# Patient Record
Sex: MALE | URBAN - METROPOLITAN AREA
[De-identification: names, ages, dates, MRNs, and addresses within clinical notes are randomized per-mention and may not be internally consistent; named-entity substitution may affect disease eponyms.]

---

## 2018-03-30 ENCOUNTER — TRANSFERRED RECORDS (OUTPATIENT)
Dept: HEALTH INFORMATION MANAGEMENT | Facility: CLINIC | Age: 28
End: 2018-03-30

## 2018-03-31 ENCOUNTER — HOSPITAL ENCOUNTER (INPATIENT)
Facility: CLINIC | Age: 28
LOS: 5 days | Discharge: HOME | End: 2018-04-05
Attending: PSYCHIATRY & NEUROLOGY | Admitting: PSYCHIATRY & NEUROLOGY
Payer: COMMERCIAL

## 2018-03-31 DIAGNOSIS — F51.01 PRIMARY INSOMNIA: ICD-10-CM

## 2018-03-31 DIAGNOSIS — J30.1 CHRONIC SEASONAL ALLERGIC RHINITIS DUE TO POLLEN: ICD-10-CM

## 2018-03-31 DIAGNOSIS — F33.1 MAJOR DEPRESSIVE DISORDER, RECURRENT EPISODE, MODERATE (H): ICD-10-CM

## 2018-03-31 DIAGNOSIS — K21.9 GASTROESOPHAGEAL REFLUX DISEASE WITHOUT ESOPHAGITIS: ICD-10-CM

## 2018-03-31 DIAGNOSIS — F41.1 GAD (GENERALIZED ANXIETY DISORDER): Primary | ICD-10-CM

## 2018-03-31 PROBLEM — R45.851 SUICIDAL IDEATION: Status: ACTIVE | Noted: 2018-03-31

## 2018-03-31 PROCEDURE — 12400007 ZZH R&B MH INTERMEDIATE UMMC

## 2018-03-31 PROCEDURE — 25000132 ZZH RX MED GY IP 250 OP 250 PS 637: Performed by: PSYCHIATRY & NEUROLOGY

## 2018-03-31 RX ORDER — HYDROXYZINE HYDROCHLORIDE 25 MG/1
25-50 TABLET, FILM COATED ORAL EVERY 4 HOURS PRN
Status: DISCONTINUED | OUTPATIENT
Start: 2018-03-31 | End: 2018-04-02

## 2018-03-31 RX ORDER — ESZOPICLONE 3 MG/1
3 TABLET, FILM COATED ORAL AT BEDTIME
Status: DISCONTINUED | OUTPATIENT
Start: 2018-03-31 | End: 2018-04-01

## 2018-03-31 RX ORDER — TRAZODONE HYDROCHLORIDE 50 MG/1
50 TABLET, FILM COATED ORAL
Status: DISCONTINUED | OUTPATIENT
Start: 2018-03-31 | End: 2018-04-02

## 2018-03-31 RX ORDER — ACETAMINOPHEN 325 MG/1
650 TABLET ORAL EVERY 4 HOURS PRN
Status: DISCONTINUED | OUTPATIENT
Start: 2018-03-31 | End: 2018-04-05 | Stop reason: HOSPADM

## 2018-03-31 RX ORDER — BISACODYL 10 MG
10 SUPPOSITORY, RECTAL RECTAL DAILY PRN
Status: DISCONTINUED | OUTPATIENT
Start: 2018-03-31 | End: 2018-04-05 | Stop reason: HOSPADM

## 2018-03-31 RX ORDER — ALUMINA, MAGNESIA, AND SIMETHICONE 2400; 2400; 240 MG/30ML; MG/30ML; MG/30ML
30 SUSPENSION ORAL EVERY 4 HOURS PRN
Status: DISCONTINUED | OUTPATIENT
Start: 2018-03-31 | End: 2018-04-05 | Stop reason: HOSPADM

## 2018-03-31 RX ORDER — OLANZAPINE 10 MG/1
10 TABLET ORAL
Status: DISCONTINUED | OUTPATIENT
Start: 2018-03-31 | End: 2018-04-02

## 2018-03-31 RX ORDER — OLANZAPINE 10 MG/2ML
10 INJECTION, POWDER, FOR SOLUTION INTRAMUSCULAR
Status: DISCONTINUED | OUTPATIENT
Start: 2018-03-31 | End: 2018-04-02

## 2018-03-31 RX ORDER — LORAZEPAM 0.5 MG/1
0.5 TABLET ORAL 3 TIMES DAILY PRN
Status: DISCONTINUED | OUTPATIENT
Start: 2018-03-31 | End: 2018-04-01

## 2018-03-31 RX ADMIN — ESZOPICLONE 3 MG: 3 TABLET, FILM COATED ORAL at 21:20

## 2018-03-31 RX ADMIN — LORAZEPAM 0.5 MG: 0.5 TABLET ORAL at 19:36

## 2018-03-31 RX ADMIN — VORTIOXETINE 10 MG: 10 TABLET, FILM COATED ORAL at 21:20

## 2018-03-31 ASSESSMENT — ACTIVITIES OF DAILY LIVING (ADL)
AMBULATION: 0-->INDEPENDENT
RETIRED_EATING: 0-->INDEPENDENT
GROOMING: INDEPENDENT
SWALLOWING: 0-->SWALLOWS FOODS/LIQUIDS WITHOUT DIFFICULTY
TOILETING: 0-->INDEPENDENT
LAUNDRY: WITH SUPERVISION
DRESS: 0-->INDEPENDENT
TRANSFERRING: 0-->INDEPENDENT
DRESS: INDEPENDENT
ORAL_HYGIENE: INDEPENDENT
RETIRED_COMMUNICATION: 0-->UNDERSTANDS/COMMUNICATES WITHOUT DIFFICULTY
COGNITION: 0 - NO COGNITION ISSUES REPORTED
BATHING: 0-->INDEPENDENT

## 2018-03-31 NOTE — PROGRESS NOTES
03/31/18 5440   Patient Belongings   Did you bring any home meds/supplements to the hospital?  Yes   Patient Belongings other (see comments)   Disposition of Belongings Locker   Belongings Search Yes   Clothing Search Yes   Second Staff Avila     Locker #12: blue luggage bag, brown leather coat, snacks, hot sauce, sandals and loafers.    Medications Lunesta- 14 pills, Lorazepam 0.5 mg-- 64 pills, Trintellix- 10 mg    Nothing was sent down to security.    A               Admission:  I am responsible for any personal items that are not sent to the safe or pharmacy.  [facility] is not responsible for loss, theft or damage of any property in my possession.    Signature:  _________________________________ Date: _______  Time: _____                                              Staff Signature:  ____________________________ Date: ________  Time: _____      2nd Staff person, if patient is unable/unwilling to sign:    Signature: ________________________________ Date: ________  Time: _____     Discharge:  [facility] has returned all of my personal belongings:    Signature: _________________________________ Date: ________  Time: _____                                          Staff Signature:  ____________________________ Date: ________  Time: _____

## 2018-03-31 NOTE — IP AVS SNAPSHOT
MRN:[id number]                      After Visit Summary   3/31/2018    Jomar Cruz    MRN: [id number]           Thank you!     Thank you for choosing [facility] for your care. Our goal is always to provide you with excellent care.        Patient Information     Date Of Birth          1990        Designated Caregiver       Most Recent Value    Caregiver    Will someone help with your care after discharge? yes    Name of designated caregiver Family-Arms Worker    Phone number of caregiver [phone]- Wife Macey    Caregiver address ---      About your hospital stay     You were admitted on:  March 31, 2018 You last received care in the:  [facility]    You were discharged on:  April 4, 2018       Who to Call     For medical emergencies, please call 911.  For non-urgent questions about your medical care, please call your primary care provider or clinic, None          Attending Provider     Provider Specialty    Clif Morgan MD Psychiatry       Primary Care Provider    None Specified      Further instructions from your care team        Behavioral Discharge Planning and Instructions      Summary:  You were admitted on 3/31/2018  due to Suicidal Ideation.  You were treated by Dr. Clif Morgan MD and discharged on 04/04/2018 from [facility].  You reported that you will work with your outpatient team to set up a CD assessment and follow those recommendations.      Principal Diagnosis:   1.  Major depression, recurrent, severe without psychotic features, F33.2.   2.  Alcohol use disorder, severe.   3.  Attention deficit disorder.   4.  Generalized anxiety disorder with social phobia.     Health Care Follow-up Appointments:   1. [facility] Appointment:  Date/Time: Friday, April 6, 2018 @ 3:00pm  Provider: Calin Cesar    2. Therapy  Appointment:  Date/Time: Monday, April 9, 2018 @ 1:30pm  Provider: Julieta Umanzor    The location for your upcoming appointments:  [facility]  [facility]  [address] [place] [id number]  Phone: [phone]  The Health Unit Coordinator has faxed these discharge instructions to Fax: [phone]    3. Medication Management Appointment:  Date/Time:  Tuesday, April 10, 2018 @ 9:00am   Provider: Ce Rose CNP    The location for your upcoming appointment:  [facility]  [address] [place] [address]  Phone: [phone]  The [facility] Unit Coordinator has faxed these discharge instructions to Fax: [phone].  Attend all scheduled appointments with your outpatient providers. Call at least 24 hours in advance if you need to reschedule an appointment to ensure continued access to your outpatient providers.   Major Treatments, Procedures and Findings:  You were provided with: a psychiatric assessment, assessed for medical stability, medication evaluation and/or management, group therapy, individual therapy, milieu management and medical interventions    Symptoms to Report: feeling more aggressive, increased confusion, losing more sleep, mood getting worse or thoughts of suicide    Early warning signs can include: increased depression or anxiety sleep disturbances increased thoughts or behaviors of suicide or self-harm  increased unusual thinking, such as paranoia or hearing voices    Safety and Wellness:  Take all medicines as directed.  Make no changes unless your doctor suggests them.      Follow treatment recommendations.  Refrain from alcohol and non-prescribed drugs.  If there is a concern for safety, call 911.    Resources:   [facility]  Crisis Response Team [phone]  Adult Mental Health Crisis Response Services can help during a mental health crisis. Crisis response can help you to cope with a current crisis and to stay in your home and community. A trained mental health crisis responder assesses the crisis, helps to cope with the crisis, and follows up to help connect to longer term  support and services.  If you need more help to be safe, crisis response can help to set up hospital care or to access a crisis stabilization bed.  Every county in the [facility] has a mental health crisis telephone number.  Most [facility] also provide other kinds of crisis help, like:  * Mobile Crisis Response Services: Help from trained crisis responders who will come to your home (or a meeting place that you choose) to help you in a crisis  * Residential Crisis Services: Help in a facility where you can stay for a few days until the crisis is under control  * Crisis Stabilization service: Services after Crisis Response to help you to connect with other support and skills to avoid future crises    [facility]-  Phone: [phone]  [facility], located in [place], provides a broad scope of quality mental health services to our community. We strive to promote mental health and total well-being for our community members, ensuring respect and dignity during all aspects of care.      [facility]ensive [facility]idential [facility]  [address] [place] [address]  Phone: [phone]  [facility] is a 10-bed residential facility located in [place], [place]. [facility] is licensed to treat adults with serious and persistent mental illness. [facility] services develop and enhance psychiatric stability, personal and emotional adjustment, self-sufficiency, and skills to live in a more independent setting.    [facility] [place] [facility]  You and your family would benefit from the support groups at [facility] [[facility]].   Www.namihelps.org    www.[facility].org  The [place] [facility] - [facility] has a parent support and educator staff - Glenn De Paz - that can be a support for your parents. There are also many classes that are available to you and your family.  Glenn can be reached at [phone]  "xt 106 or through e-mail at [email or web address].    The treatment team has appreciated the opportunity to work with you.     Jomar,  please take care and make your recovery a daily recovery.   If you have any questions or concerns our unit number is [phone].  You may be receiving a follow-up phone call within the next three days from a representative from behavioral health.    You have identified the best phone number to reach you as [phone]          Pending Results     No orders found from 3/29/2018 to 2018.            Admission Information     Date & Time Provider Department Dept. Phone    3/31/2018 Clif Morgan MD [facility] 30[facility] [phone]      Your Vitals Were     Blood Pressure Pulse Temperature Respirations Height Weight    123/74 65 97.3  F (36.3  C) (Oral) 16 1.829 m (6') 113.4 kg (250 lb)    Pulse Oximetry BMI (Body Mass Index)                98% 33.91 kg/m2          Media [employer] Information     [employer] lets you send messages to your doctor, view your test results, renew your prescriptions, schedule appointments and more. To sign up, go to [email or web address] . Click on \"Log in\" on the left side of the screen, which will take you to the Welcome page. Then click on \"Sign up Now\" on the right side of the page.     You will be asked to enter the access code listed below, as well as some personal information. Please follow the directions to create your username and password.     Your access code is: [email or web address]  Expires: 7/3/2018  1:04 PM     Your access code will  in 90 days. If you need help or a new code, please call your [facility] clinic or [phone].        Care EveryWhere ID     This is your Care EveryWhere ID. This could be used by other organizations to access your [facility] medical records  [id number]        Equal Access to Services     SUSIE BROWNLEE : Tee Rivers, aquilino guajardo, qaybfaizan herndon. So [facility] " [phone].    ATENCIÓN: Si judith garcia, tiene a henriquez disposición servicios gratuitos de asistencia lingüística. Saloni burrell [phone].    We comply with applicable federal civil rights laws and [place] laws. We do not discriminate on the basis of race, color, national origin, age, disability, sex, sexual orientation, or gender identity.               Review of your medicines      START taking        Dose / Directions    BusPIRone HCl 30 MG Tabs   Used for:  JESSICA (generalized anxiety disorder)        Dose:  30 mg   Take 30 mg by mouth 2 times daily   Quantity:  60 tablet   Refills:  0       fluticasone 50 MCG/ACT spray   Commonly known as:  FLONASE   Used for:  Chronic seasonal allergic rhinitis due to pollen        Dose:  1 spray   Start taking on:  4/5/2018   Spray 1 spray into both nostrils daily   Quantity:  1 Bottle   Refills:  0       loratadine 10 MG tablet   Commonly known as:  CLARITIN   Used for:  Chronic seasonal allergic rhinitis due to pollen        Dose:  10 mg   Start taking on:  4/5/2018   Take 1 tablet (10 mg) by mouth daily   Quantity:  30 tablet   Refills:  0       omeprazole 20 MG CR capsule   Commonly known as:  priLOSEC   Used for:  Gastroesophageal reflux disease without esophagitis        Dose:  20 mg   Start taking on:  4/5/2018   Take 1 capsule (20 mg) by mouth every morning (before breakfast)   Quantity:  30 capsule   Refills:  0       OXcarbazepine 150 MG tablet   Commonly known as:  TRILEPTAL   Used for:  JESSICA (generalized anxiety disorder)        Dose:  150 mg   Take 1 tablet (150 mg) by mouth 2 times daily   Quantity:  60 tablet   Refills:  0       perphenazine 2 MG tablet   Used for:  JESSICA (generalized anxiety disorder)        Dose:  2-4 mg   Take 1-2 tablets (2-4 mg) by mouth 2 times daily as needed For severe anxiety   Quantity:  20 tablet   Refills:  0         CONTINUE these medicines which may have CHANGED, or have new prescriptions. If we are uncertain of the size of  tablets/capsules you have at home, strength may be listed as something that might have changed.        Dose / Directions    eszopiclone 3 MG tablet   Commonly known as:  LUNESTA   This may have changed:  medication strength   Used for:  Primary insomnia        Dose:  3 mg   Take 1 tablet (3 mg) by mouth At Bedtime   Quantity:  30 tablet   Refills:  0       vortioxetine 20 MG tablet   Commonly known as:  TRINTELLIX   This may have changed:    - medication strength  - how much to take   Used for:  Major depressive disorder, recurrent episode, moderate (H), JESSICA (generalized anxiety disorder)        Dose:  20 mg   Take 1 tablet (20 mg) by mouth daily   Quantity:  30 tablet   Refills:  0         STOP taking     LORAZEPAM PO                Where to get your medicines      These medications were sent to [facility] [place], [place] - [address]  [address] [address]     Phone:  [phone]     BusPIRone HCl 30 MG Tabs    fluticasone 50 MCG/ACT spray    loratadine 10 MG tablet    omeprazole 20 MG CR capsule    OXcarbazepine 150 MG tablet    perphenazine 2 MG tablet    vortioxetine 20 MG tablet         Some of these will need a paper prescription and others can be bought over the counter. Ask your nurse if you have questions.     Bring a paper prescription for each of these medications     eszopiclone 3 MG tablet                Protect others around you: Learn how to safely use, store and throw away your medicines at www.disposemymeds.org.             Medication List: This is a list of all your medications and when to take them. Check marks below indicate your daily home schedule. Keep this list as a reference.      Medications           Morning Afternoon Evening Bedtime As Needed    BusPIRone HCl 30 MG Tabs   Take 30 mg by mouth 2 times daily   Last time this was given:  30 mg on 4/4/2018  8:41 PM                                eszopiclone 3 MG tablet   Commonly known as:   LUNESTA   Take 1 tablet (3 mg) by mouth At Bedtime   Last time this was given:  3 mg on 4/4/2018  8:41 PM                                fluticasone 50 MCG/ACT spray   Commonly known as:  FLONASE   Spray 1 spray into both nostrils daily   Start taking on:  4/5/2018   Last time this was given:  1 spray on 4/4/2018  8:17 AM                                loratadine 10 MG tablet   Commonly known as:  CLARITIN   Take 1 tablet (10 mg) by mouth daily   Start taking on:  4/5/2018   Last time this was given:  10 mg on 4/4/2018  8:13 AM                                omeprazole 20 MG CR capsule   Commonly known as:  priLOSEC   Take 1 capsule (20 mg) by mouth every morning (before breakfast)   Start taking on:  4/5/2018   Last time this was given:  20 mg on 4/4/2018  8:13 AM                                OXcarbazepine 150 MG tablet   Commonly known as:  TRILEPTAL   Take 1 tablet (150 mg) by mouth 2 times daily   Last time this was given:  300 mg on 4/4/2018  8:41 PM                                perphenazine 2 MG tablet   Take 1-2 tablets (2-4 mg) by mouth 2 times daily as needed For severe anxiety                                vortioxetine 20 MG tablet   Commonly known as:  TRINTELLIX   Take 1 tablet (20 mg) by mouth daily   Last time this was given:  20 mg on 4/4/2018  8:13 AM                                          More Information        Trifluoperazine tablets  Brand Name: Stelazine  What is this medicine?  TRIFLUOPERAZINE (trye floo oh PER a zeen) is used to treat schizophrenia. This medicine may also be used for the short-term treatment of anxiety.  How should I use this medicine?  Take this medicine by mouth with a glass of water. Follow the directions on the prescription label. Take your doses at regular intervals. Do not take your medicine more often than directed. Do not stop taking except on the advice of your doctor or health care professional.  Talk to your pediatrician regarding the use of this medicine in  children. While this medicine may be prescribed for children as young as 6 years of age for selected conditions, precautions do apply.  What side effects may I notice from receiving this medicine?  Side effects that you should report to your doctor or health care professional as soon as possible:    abnormal production of milk in females    allergic reactions like skin rash, itching or hives, swelling of the face, lips, or tongue    blurred vision    breast enlargement in both males and females    breathing problems    chest pain, fast or irregular heartbeat    confusion, restlessness    dark yellow or brown urine    dizziness or fainting spells    drooling, shaking    fever, chills, sore throat    involuntary or uncontrollable movements of the eyes, mouth, head, arms, and legs    seizures    stomach area pain    unusual bleeding or bruising    unusually weak or tired    yellowing of the eyes or skin  Side effects that usually do not require medical attention (report to your doctor or health care professional if they continue or are bothersome):    difficulty passing urine    difficulty sleeping    headache    sexual dysfunction  What may interact with this medicine?  Do not take this medicine with any of the following medications:    amoxapine    certain antibiotics like gatifloxacin, grepafloxacin, sparfloxacin    cisapride    clozapine    droperidol    ephedrine    kaolin; pectin    levomethadyl    medicines for mental depression    medicines to control irregular heart rhythms    pimozide    pindolol    propranolol    risperidone    trimethobenzamide    ziprasidone  This medicine may also interact with the following medications:    barbiturates, like phenobarbital    diuretics    guanethidine    local and general anesthetics    phenytoin    warfarin  What if I miss a dose?  If you miss a dose, take it as soon as you can. If it is almost time for your next dose, take only that dose. Do not take double or extra  doses.  Where should I keep my medicine?  Keep out of the reach of children.  Store at room temperature between 15 and 30 degrees C (59 and 86 degrees F). Throw away any unused medicine after the expiration date.  What should I tell my health care provider before I take this medicine?  They need to know if you have any of these conditions:    blood disorders or disease    dementia    head injury or coma    liver disease    Parkinson's disease    uncontrollable movement disorder    an unusual or allergic reaction to trifluoperazine, other medicines foods, dyes, or preservatives    pregnant or trying to get pregnant    breast-feeding  What should I watch for while using this medicine?  Visit your doctor or health care professional for regular checks on your progress.  You may get drowsy, dizzy, or have blurred vision. Do not drive, use machinery, or do anything that needs mental alertness until you know how this medicine affects you. Do not stand or sit up quickly, especially if you are an older patient. This reduces the risk of dizzy or fainting spells. Alcohol can increase possible dizziness or drowsiness. Avoid alcoholic drinks.  This medicine can reduce the response of your body to heat or cold. Dress warm in cold weather and stay hydrated in hot weather. If possible, avoid extreme temperatures like saunas, hot tubs, very hot or cold showers, or activities that can cause dehydration such as vigorous exercise.  This medicine can make you more sensitive to the sun. Keep out of the sun. If you cannot avoid being in the sun, wear protective clothing and use sunscreen. Do not use sun lamps or tanning beds/booths.  Your mouth may get dry. Chewing sugarless gum or sucking hard candy, and drinking plenty of water may help. Contact your doctor if the problem does not go away or is severe.  NOTE:This sheet is a summary. It may not cover all possible information. If you have questions about this medicine, talk to your  doctor, pharmacist, or health care provider. Copyright  2017 [employer]

## 2018-03-31 NOTE — IP AVS SNAPSHOT
[address]    [address]    [place] [address]    Phone:  [phone]                                       After Visit Summary   3/31/2018    Jomar Cruz    MRN: [id number]           After Visit Summary Signature Page     I have received my discharge instructions, and my questions have been answered. I have discussed any challenges I see with this plan with the nurse or doctor.    ..........................................................................................................................................  Patient/Patient Representative Signature      ..........................................................................................................................................  Patient Representative Print Name and Relationship to Patient    ..................................................               ................................................  Date                                            Time    ..........................................................................................................................................  Reviewed by Signature/Title    ...................................................              ..............................................  Date                                                            Time

## 2018-04-01 PROCEDURE — 99207 ZZC CONSULT E&M CHANGED TO INITIAL LEVEL: CPT | Performed by: PHYSICIAN ASSISTANT

## 2018-04-01 PROCEDURE — 12400007 ZZH R&B MH INTERMEDIATE UMMC

## 2018-04-01 PROCEDURE — 25000132 ZZH RX MED GY IP 250 OP 250 PS 637: Performed by: PHYSICIAN ASSISTANT

## 2018-04-01 PROCEDURE — 25000132 ZZH RX MED GY IP 250 OP 250 PS 637: Performed by: PSYCHIATRY & NEUROLOGY

## 2018-04-01 PROCEDURE — 99222 1ST HOSP IP/OBS MODERATE 55: CPT | Performed by: PHYSICIAN ASSISTANT

## 2018-04-01 RX ORDER — MULTIPLE VITAMINS W/ MINERALS TAB 9MG-400MCG
1 TAB ORAL DAILY
Status: DISCONTINUED | OUTPATIENT
Start: 2018-04-01 | End: 2018-04-05 | Stop reason: HOSPADM

## 2018-04-01 RX ORDER — GUANFACINE 1 MG/1
1 TABLET ORAL AT BEDTIME
Status: DISCONTINUED | OUTPATIENT
Start: 2018-04-01 | End: 2018-04-05 | Stop reason: HOSPADM

## 2018-04-01 RX ORDER — LORATADINE 10 MG/1
10 TABLET ORAL DAILY
Status: DISCONTINUED | OUTPATIENT
Start: 2018-04-01 | End: 2018-04-05 | Stop reason: HOSPADM

## 2018-04-01 RX ORDER — BUSPIRONE HYDROCHLORIDE 15 MG/1
30 TABLET ORAL 2 TIMES DAILY
Status: DISCONTINUED | OUTPATIENT
Start: 2018-04-01 | End: 2018-04-05 | Stop reason: HOSPADM

## 2018-04-01 RX ORDER — FOLIC ACID 1 MG/1
1 TABLET ORAL DAILY
Status: DISCONTINUED | OUTPATIENT
Start: 2018-04-01 | End: 2018-04-05 | Stop reason: HOSPADM

## 2018-04-01 RX ORDER — LANOLIN ALCOHOL/MO/W.PET/CERES
100 CREAM (GRAM) TOPICAL DAILY
Status: COMPLETED | OUTPATIENT
Start: 2018-04-01 | End: 2018-04-03

## 2018-04-01 RX ORDER — BUSPIRONE HYDROCHLORIDE 5 MG/1
5 TABLET ORAL 3 TIMES DAILY
Status: DISCONTINUED | OUTPATIENT
Start: 2018-04-01 | End: 2018-04-01

## 2018-04-01 RX ORDER — ATENOLOL 25 MG/1
50 TABLET ORAL DAILY PRN
Status: DISCONTINUED | OUTPATIENT
Start: 2018-04-01 | End: 2018-04-05 | Stop reason: HOSPADM

## 2018-04-01 RX ORDER — DIAZEPAM 5 MG
5-20 TABLET ORAL EVERY 30 MIN PRN
Status: DISCONTINUED | OUTPATIENT
Start: 2018-04-01 | End: 2018-04-02

## 2018-04-01 RX ORDER — FLUTICASONE PROPIONATE 50 MCG
1 SPRAY, SUSPENSION (ML) NASAL DAILY
Status: DISCONTINUED | OUTPATIENT
Start: 2018-04-01 | End: 2018-04-05 | Stop reason: HOSPADM

## 2018-04-01 RX ADMIN — VORTIOXETINE 10 MG: 10 TABLET, FILM COATED ORAL at 08:47

## 2018-04-01 RX ADMIN — FLUTICASONE PROPIONATE 1 SPRAY: 50 SPRAY, METERED NASAL at 11:50

## 2018-04-01 RX ADMIN — Medication 100 MG: at 11:45

## 2018-04-01 RX ADMIN — MULTIPLE VITAMINS W/ MINERALS TAB 1 TABLET: TAB at 11:45

## 2018-04-01 RX ADMIN — GUANFACINE HYDROCHLORIDE 1 MG: 1 TABLET ORAL at 21:53

## 2018-04-01 RX ADMIN — BUSPIRONE HYDROCHLORIDE 30 MG: 15 TABLET ORAL at 11:45

## 2018-04-01 RX ADMIN — OLANZAPINE 10 MG: 10 TABLET, FILM COATED ORAL at 14:01

## 2018-04-01 RX ADMIN — IBUPROFEN 800 MG: 600 TABLET ORAL at 11:45

## 2018-04-01 RX ADMIN — HYDROXYZINE HYDROCHLORIDE 50 MG: 25 TABLET, FILM COATED ORAL at 13:17

## 2018-04-01 RX ADMIN — TRAZODONE HYDROCHLORIDE 50 MG: 50 TABLET ORAL at 21:54

## 2018-04-01 RX ADMIN — BUSPIRONE HYDROCHLORIDE 30 MG: 15 TABLET ORAL at 21:53

## 2018-04-01 RX ADMIN — LORAZEPAM 0.5 MG: 0.5 TABLET ORAL at 08:47

## 2018-04-01 RX ADMIN — LORATADINE 10 MG: 10 TABLET ORAL at 11:45

## 2018-04-01 RX ADMIN — FOLIC ACID 1 MG: 1 TABLET ORAL at 11:45

## 2018-04-01 ASSESSMENT — ACTIVITIES OF DAILY LIVING (ADL)
GROOMING: INDEPENDENT
LAUNDRY: WITH SUPERVISION
ORAL_HYGIENE: INDEPENDENT
DRESS: INDEPENDENT
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
## 2018-04-02 LAB
DEPRECATED CALCIDIOL+CALCIFEROL SERPL-MC: 12 UG/L (ref 20–75)
GGT SERPL-CCNC: 12 U/L (ref 0–75)

## 2018-04-02 PROCEDURE — 82306 VITAMIN D 25 HYDROXY: CPT | Performed by: PSYCHIATRY & NEUROLOGY

## 2018-04-02 PROCEDURE — 36415 COLL VENOUS BLD VENIPUNCTURE: CPT | Performed by: PSYCHIATRY & NEUROLOGY

## 2018-04-02 PROCEDURE — 82977 ASSAY OF GGT: CPT | Performed by: PSYCHIATRY & NEUROLOGY

## 2018-04-02 PROCEDURE — 25000132 ZZH RX MED GY IP 250 OP 250 PS 637: Performed by: PSYCHIATRY & NEUROLOGY

## 2018-04-02 PROCEDURE — 99233 SBSQ HOSP IP/OBS HIGH 50: CPT | Performed by: PSYCHIATRY & NEUROLOGY

## 2018-04-02 PROCEDURE — 25000132 ZZH RX MED GY IP 250 OP 250 PS 637: Performed by: PHYSICIAN ASSISTANT

## 2018-04-02 PROCEDURE — 90853 GROUP PSYCHOTHERAPY: CPT

## 2018-04-02 PROCEDURE — 12400007 ZZH R&B MH INTERMEDIATE UMMC

## 2018-04-02 RX ORDER — TRIFLUOPERAZINE HYDROCHLORIDE 2 MG/1
2-4 TABLET, FILM COATED ORAL 3 TIMES DAILY PRN
Status: DISCONTINUED | OUTPATIENT
Start: 2018-04-02 | End: 2018-04-05 | Stop reason: HOSPADM

## 2018-04-02 RX ORDER — OXCARBAZEPINE 300 MG/1
300 TABLET, FILM COATED ORAL 2 TIMES DAILY
Status: DISCONTINUED | OUTPATIENT
Start: 2018-04-02 | End: 2018-04-05 | Stop reason: HOSPADM

## 2018-04-02 RX ORDER — ESZOPICLONE 3 MG/1
3 TABLET, FILM COATED ORAL AT BEDTIME
Status: DISCONTINUED | OUTPATIENT
Start: 2018-04-02 | End: 2018-04-05 | Stop reason: HOSPADM

## 2018-04-02 RX ORDER — TRAZODONE HYDROCHLORIDE 150 MG/1
150 TABLET ORAL
Status: DISCONTINUED | OUTPATIENT
Start: 2018-04-02 | End: 2018-04-05 | Stop reason: HOSPADM

## 2018-04-02 RX ADMIN — FLUTICASONE PROPIONATE 1 SPRAY: 50 SPRAY, METERED NASAL at 08:44

## 2018-04-02 RX ADMIN — GUANFACINE HYDROCHLORIDE 1 MG: 1 TABLET ORAL at 22:28

## 2018-04-02 RX ADMIN — FOLIC ACID 1 MG: 1 TABLET ORAL at 08:45

## 2018-04-02 RX ADMIN — OXCARBAZEPINE 300 MG: 300 TABLET ORAL at 13:24

## 2018-04-02 RX ADMIN — MAGNESIUM HYDROXIDE 30 ML: 400 SUSPENSION ORAL at 21:52

## 2018-04-02 RX ADMIN — MULTIPLE VITAMINS W/ MINERALS TAB 1 TABLET: TAB at 08:45

## 2018-04-02 RX ADMIN — VORTIOXETINE 10 MG: 10 TABLET, FILM COATED ORAL at 08:45

## 2018-04-02 RX ADMIN — LORATADINE 10 MG: 10 TABLET ORAL at 08:45

## 2018-04-02 RX ADMIN — BUSPIRONE HYDROCHLORIDE 30 MG: 15 TABLET ORAL at 08:44

## 2018-04-02 RX ADMIN — IBUPROFEN 800 MG: 600 TABLET ORAL at 09:33

## 2018-04-02 RX ADMIN — OXCARBAZEPINE 300 MG: 300 TABLET ORAL at 21:51

## 2018-04-02 RX ADMIN — BUSPIRONE HYDROCHLORIDE 30 MG: 15 TABLET ORAL at 21:50

## 2018-04-02 RX ADMIN — HYDROXYZINE HYDROCHLORIDE 50 MG: 25 TABLET, FILM COATED ORAL at 12:15

## 2018-04-02 RX ADMIN — Medication: at 21:49

## 2018-04-02 RX ADMIN — Medication 100 MG: at 08:45

## 2018-04-02 RX ADMIN — TRIFLUOPERAZINE HYDROCHLORIDE 4 MG: 2 TABLET, FILM COATED ORAL at 16:35

## 2018-04-02 RX ADMIN — OMEPRAZOLE 20 MG: 20 CAPSULE, DELAYED RELEASE ORAL at 08:45

## 2018-04-02 RX ADMIN — ESZOPICLONE 3 MG: 3 TABLET, FILM COATED ORAL at 22:28

## 2018-04-02 RX ADMIN — ACETAMINOPHEN 650 MG: 325 TABLET ORAL at 12:15

## 2018-04-02 RX ADMIN — TRIFLUOPERAZINE HYDROCHLORIDE 4 MG: 2 TABLET, FILM COATED ORAL at 21:50

## 2018-04-02 ASSESSMENT — ACTIVITIES OF DAILY LIVING (ADL)
GROOMING: INDEPENDENT
ORAL_HYGIENE: INDEPENDENT
LAUNDRY: WITH SUPERVISION
ORAL_HYGIENE: INDEPENDENT
LAUNDRY: WITH SUPERVISION
DRESS: STREET CLOTHES
DRESS: INDEPENDENT;STREET CLOTHES
GROOMING: INDEPENDENT;SHOWER
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
## 2018-04-02 NOTE — PROGRESS NOTES
"[facility], [place]   Psychiatric Progress Note         Interim History and Subjective Reports:   The patient's care was discussed with the treatment team during the daily team meeting.  Staff reports: no acute issues    Depression severity scale 0-10 (10=most severe):  Today: 7  He complained of severe anxiety.  He described trying many medications in the past including gabapentin, Vistaril, propranolol felt much success.  He has tried numerous antidepressants and a few antipsychotic medications without gaining significant benefit.  Sleep is poor off Lunesta, energy is low, appetite is low, concentration is limited.  Suicidal thoughts are reported, described as passive, able to contract for safety.  He denied homicidal ideation.  Tolerating medications without side effects.    He reported his motivation for sobriety, described self-medicating symptoms of anxiety with alcohol, identified a plan to pursue residential treatment and sober housing to help maintain sobriety.           Scheduled Medications:       thiamine  100 mg Oral Daily     folic acid  1 mg Oral Daily     multivitamin, therapeutic with minerals  1 tablet Oral Daily     guanFACINE  1 mg Oral At Bedtime     omeprazole  20 mg Oral QAM AC     busPIRone  30 mg Oral BID     fluticasone  1 spray Both Nostrils Daily     loratadine  10 mg Oral Daily     vortioxetine  10 mg Oral Daily          Allergies:    No Known Allergies       Labs:     Recent Results (from the past 24 hour(s))   GGT    Collection Time: 04/02/18  7:35 AM   Result Value Ref Range    GGT 12 0 - 75 U/L          Vitals:   /89  Pulse 64  Temp 96.7  F (35.9  C)  Resp 16  Ht 1.829 m (6')  Wt 110.2 kg (243 lb)  BMI 32.96 kg/m2  Weight: 243 lbs 0 oz          Height: 6' 0\"           Body mass index is 32.96 kg/(m^2).        Mental Status Examination:   Appearance: awake, alert  Attitude:  cooperative  Eye Contact:  fair  Mood:  depressed  Affect:  intensity " is blunted  Speech:  clear, coherent  Psychomotor Behavior:  no evidence of tardive dyskinesia, dystonia, or tics  Throught Process:  linear  Associations:  no loose associations  Thought Content:  no evidence of psychotic thought and passive suicidal ideation present  Insight:  fair  Judgement:  intact  Oriented to:  time, person, and place  Attention Span and Concentration:  fair  Recent and Remote Memory:  fair         DIagnoses:     1.  Major depression, recurrent, severe without psychotic features, F33.2.   2.  Alcohol use disorder, severe.   3.  Attention deficit disorder.   4.  Generalized anxiety disorder with social phobia.          Plan:   1. Biological treatments:  --increase trintellex to address mood and anxiety symptoms  --add trileptal for reduction of anxiety noting he has tried various first and second line treatment options  --restart lunesta for insomnia noting he has tried numerous alternatives without benefit  --add stelazine prn anxiety as we attempt to avoid benzodiazepines.    2. Psychosocial treatments:   --addressed with SW consult and groups   --arrange a chemical health assessment    3. Dispo:  -- attempt to transfer to a chemical addiction treatment facility if possible

## 2018-04-02 NOTE — PLAN OF CARE
Problem: Depressive Symptoms  Goal: Depressive Symptoms  Signs and symptoms of listed problems will be absent or manageable.   Outcome: No Change  Patient pleasant and cooperative, visible in milieu.  Flat affect, denies SI/SIB, rated depression 3/10, anxiety 710.  MSSA 2 and 3 this shift, received prn ibuprofen for pain.  Took scheduled medications with no problem.  Resting comfortably, will continue to monitor closely.
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
## 2018-04-02 NOTE — PLAN OF CARE
Problem: Depressive Symptoms  Goal: Social and Therapeutic (Depression)  Signs and symptoms of listed problems will be absent or manageable.   INITIAL NOTE    Pt attended OT group for the first time today. He appeared engaged in discussion about coping skills and initiated contribution. He was articulate and on topic with his contributions. He participated in exploration of coping skill and reported that it help take his mind of his symptoms.     Pt will be informed of the role of occupational therapy plays in treatment and offered the self-assessment form to encourage engagement in the therapeutic process.

## 2018-04-03 PROCEDURE — 25000132 ZZH RX MED GY IP 250 OP 250 PS 637: Performed by: PSYCHIATRY & NEUROLOGY

## 2018-04-03 PROCEDURE — 97150 GROUP THERAPEUTIC PROCEDURES: CPT | Mod: GO

## 2018-04-03 PROCEDURE — 12400007 ZZH R&B MH INTERMEDIATE UMMC

## 2018-04-03 PROCEDURE — 99232 SBSQ HOSP IP/OBS MODERATE 35: CPT | Performed by: PSYCHIATRY & NEUROLOGY

## 2018-04-03 PROCEDURE — 90853 GROUP PSYCHOTHERAPY: CPT

## 2018-04-03 PROCEDURE — 25000132 ZZH RX MED GY IP 250 OP 250 PS 637: Performed by: PHYSICIAN ASSISTANT

## 2018-04-03 RX ADMIN — OXCARBAZEPINE 300 MG: 300 TABLET ORAL at 22:24

## 2018-04-03 RX ADMIN — FLUTICASONE PROPIONATE 1 SPRAY: 50 SPRAY, METERED NASAL at 08:32

## 2018-04-03 RX ADMIN — OXCARBAZEPINE 300 MG: 300 TABLET ORAL at 08:31

## 2018-04-03 RX ADMIN — OMEPRAZOLE 20 MG: 20 CAPSULE, DELAYED RELEASE ORAL at 08:30

## 2018-04-03 RX ADMIN — VORTIOXETINE 20 MG: 10 TABLET, FILM COATED ORAL at 08:30

## 2018-04-03 RX ADMIN — TRIFLUOPERAZINE HYDROCHLORIDE 4 MG: 2 TABLET, FILM COATED ORAL at 14:14

## 2018-04-03 RX ADMIN — FOLIC ACID 1 MG: 1 TABLET ORAL at 08:31

## 2018-04-03 RX ADMIN — BUSPIRONE HYDROCHLORIDE 30 MG: 15 TABLET ORAL at 08:30

## 2018-04-03 RX ADMIN — BUSPIRONE HYDROCHLORIDE 30 MG: 15 TABLET ORAL at 22:24

## 2018-04-03 RX ADMIN — MULTIPLE VITAMINS W/ MINERALS TAB 1 TABLET: TAB at 08:30

## 2018-04-03 RX ADMIN — Medication 100 MG: at 08:30

## 2018-04-03 RX ADMIN — ESZOPICLONE 3 MG: 3 TABLET, FILM COATED ORAL at 22:24

## 2018-04-03 RX ADMIN — GUANFACINE HYDROCHLORIDE 1 MG: 1 TABLET ORAL at 22:24

## 2018-04-03 RX ADMIN — TRIFLUOPERAZINE HYDROCHLORIDE 4 MG: 2 TABLET, FILM COATED ORAL at 08:33

## 2018-04-03 RX ADMIN — LORATADINE 10 MG: 10 TABLET ORAL at 08:31

## 2018-04-03 ASSESSMENT — ACTIVITIES OF DAILY LIVING (ADL)
GROOMING: INDEPENDENT
LAUNDRY: UNABLE TO COMPLETE
DRESS: SCRUBS (BEHAVIORAL HEALTH);INDEPENDENT
ORAL_HYGIENE: INDEPENDENT

## 2018-04-03 NOTE — PROGRESS NOTES
[facility] contacted [facility] [phone] to see whether or not patient was able to return.  [facility] spoke with Matthew who stated that patient has to start the referral process over again.  Will update patient and team about this information and look at options.    Matthew will fax over the referral packet sometime this afternoon.  [facility] still has not received fax with referral packet.  [facility] had patient follow-up directly to see if he can work with them to avoid the entire referral process since he was just there.  Pt did call and discuss case with staff directly.

## 2018-04-03 NOTE — PROGRESS NOTES
Patient was calm this evening, attended and participated in community meeting, was quietly social with others. Patient denies all mental health issues and reports feeling good. Patient reports that he has no anxiety. Patient denies SI/SIB as well as hallucinations. ADL's are independent with no nutrition concerns. Patient reports that groups are effective approaches. Patient had no visitors or meetings this evening.        04/02/18 1957   Behavioral Health   Hallucinations denies / not responding to hallucinations   Thinking poor concentration   Orientation time: oriented;date: oriented;place: oriented;person: oriented   Memory baseline memory   Insight insight appropriate to situation   Judgement impaired   Eye Contact at examiner   Affect full range affect   Mood mood is calm   Physical Appearance/Attire neat;attire appropriate to age and situation   Hygiene well groomed;other (see comment)  (showered this evening)   Suicidality other (see comments)  (Patient denies)   Self Injury other (see comment)  (Patient denies)   Elopement (Patient does not appear to be a risk)   Activity other (see comment)  (visible in milieu, groups, mealtimes)   Speech coherent;clear   Medication Sensitivity no observed side effects;no stated side effects   Psychomotor / Gait steady;balanced

## 2018-04-03 NOTE — PLAN OF CARE
Problem: Depressive Symptoms  Goal: Social and Therapeutic (Depression)  Signs and symptoms of listed problems will be absent or manageable.   Pt attended 2/3 OT groups designed to facilitate positive social interaction and promote self-esteem through occupational engagement and hands on activities. Pt initiated with this writer and others. He presented with calm affect, appropriate to context. He attended others when they were speaking and engaged in casual conversation. Pt participated in goal directed activity and was again social with others. He was pulled out of group.

## 2018-04-03 NOTE — PROGRESS NOTES
"[facility], [place]   Psychiatric Progress Note         Interim History and Subjective Reports:   The patient's care was discussed with the treatment team during the daily team meeting.  Staff reports: no acute issues    Depression severity scale 0-10 (10=most severe):  Today: 2    He was happy to report that anxiety is significantly less today and that his mood is improved.  He is appreciative of the medication changes.  He remains motivated for sobriety and is hoping to reconnect with the treatment facility which was helpful for him in the past.  He inquired regarding plans for discharge.    He denied suicidal ideation.  He denied psychotic symptoms. He denied homicidal ideation.  Tolerating medications without side effects.           Scheduled Medications:       OXcarbazepine  300 mg Oral BID     eszopiclone  3 mg Oral At Bedtime     vortioxetine  20 mg Oral Daily     folic acid  1 mg Oral Daily     multivitamin, therapeutic with minerals  1 tablet Oral Daily     guanFACINE  1 mg Oral At Bedtime     omeprazole  20 mg Oral QAM AC     busPIRone  30 mg Oral BID     fluticasone  1 spray Both Nostrils Daily     loratadine  10 mg Oral Daily          Allergies:    No Known Allergies       Labs:     No results found for this or any previous visit (from the past 24 hour(s)).       Vitals:   /80 (BP Location: Left arm)  Pulse 58  Temp 96.4  F (35.8  C) (Tympanic)  Resp 16  Ht 1.829 m (6')  Wt 113.4 kg (250 lb)  SpO2 98%  BMI 33.91 kg/m2  Weight: 250 lbs 0 oz          Height: 6' 0\"           Body mass index is 33.91 kg/(m^2).        Mental Status Examination:   Appearance: awake, alert  Attitude:  cooperative  Eye Contact:  fair  Mood:  better  Affect:  appropriate and in normal range  Speech:  clear, coherent  Psychomotor Behavior:  no evidence of tardive dyskinesia, dystonia, or tics  Throught Process:  logical and linear  Associations:  no loose associations  Thought Content:  no " evidence of suicidal ideation or homicidal ideation and no evidence of psychotic thought  Insight:  fair  Judgement:  intact  Oriented to:  time, person, and place  Attention Span and Concentration:  fair  Recent and Remote Memory:  fair         DIagnoses:     1.  Major depression, recurrent, severe without psychotic features, F33.2.   2.  Alcohol use disorder, severe.   3.  Attention deficit disorder.   4.  Generalized anxiety disorder with social phobia.          Plan:   1. Biological treatments:  --Continue current medications; mood is improved, anxiety is less, tolerating well without side effects.    2. Psychosocial treatments:   --addressed with SW consult and groups   --arrange a chemical health assessment    3. Dispo:  --Patient is requesting to be discharged home and we will begin planning for this transition tomorrow.  He plans to stay with family and maintaining sobriety until a bed becomes available at a treatment facility.

## 2018-04-03 NOTE — PROGRESS NOTES
"Pt was in bed most of the shift.  He denies SI/SIB.  Pt confirms anx/dep 5 of 10.  Pt said, \"I'm feeling a lot better.\" Pt is still feeling tired + thinks it is the meds.\"  "

## 2018-04-04 VITALS
TEMPERATURE: 97.3 F | OXYGEN SATURATION: 98 % | SYSTOLIC BLOOD PRESSURE: 123 MMHG | WEIGHT: 250 LBS | HEIGHT: 72 IN | BODY MASS INDEX: 33.86 KG/M2 | DIASTOLIC BLOOD PRESSURE: 74 MMHG | HEART RATE: 65 BPM | RESPIRATION RATE: 16 BRPM

## 2018-04-04 PROCEDURE — 25000132 ZZH RX MED GY IP 250 OP 250 PS 637: Performed by: PSYCHIATRY & NEUROLOGY

## 2018-04-04 PROCEDURE — 25000132 ZZH RX MED GY IP 250 OP 250 PS 637: Performed by: PHYSICIAN ASSISTANT

## 2018-04-04 PROCEDURE — 99239 HOSP IP/OBS DSCHRG MGMT >30: CPT | Performed by: PSYCHIATRY & NEUROLOGY

## 2018-04-04 PROCEDURE — 90853 GROUP PSYCHOTHERAPY: CPT

## 2018-04-04 PROCEDURE — 12400007 ZZH R&B MH INTERMEDIATE UMMC

## 2018-04-04 PROCEDURE — 97150 GROUP THERAPEUTIC PROCEDURES: CPT | Mod: GO

## 2018-04-04 RX ORDER — BUSPIRONE HYDROCHLORIDE 30 MG/1
30 TABLET ORAL 2 TIMES DAILY
Qty: 60 TABLET | Refills: 0 | Status: SHIPPED | OUTPATIENT
Start: 2018-04-04

## 2018-04-04 RX ORDER — LORATADINE 10 MG/1
10 TABLET ORAL DAILY
Qty: 30 TABLET | Refills: 0 | Status: SHIPPED | OUTPATIENT
Start: 2018-04-05

## 2018-04-04 RX ORDER — PERPHENAZINE 2 MG/1
2-4 TABLET ORAL 2 TIMES DAILY PRN
Qty: 20 TABLET | Refills: 0 | Status: SHIPPED | OUTPATIENT
Start: 2018-04-04

## 2018-04-04 RX ORDER — ESZOPICLONE 3 MG/1
3 TABLET, FILM COATED ORAL AT BEDTIME
Qty: 30 TABLET | Refills: 0 | Status: SHIPPED | OUTPATIENT
Start: 2018-04-04

## 2018-04-04 RX ORDER — TRIFLUOPERAZINE HYDROCHLORIDE 2 MG/1
2-4 TABLET, FILM COATED ORAL 2 TIMES DAILY PRN
Qty: 30 TABLET | Refills: 0 | Status: SHIPPED | OUTPATIENT
Start: 2018-04-04 | End: 2018-04-04

## 2018-04-04 RX ORDER — OXCARBAZEPINE 150 MG/1
150 TABLET, FILM COATED ORAL 2 TIMES DAILY
Qty: 60 TABLET | Refills: 0 | Status: SHIPPED | OUTPATIENT
Start: 2018-04-04

## 2018-04-04 RX ORDER — FLUTICASONE PROPIONATE 50 MCG
1 SPRAY, SUSPENSION (ML) NASAL DAILY
Qty: 1 BOTTLE | Refills: 0 | Status: SHIPPED | OUTPATIENT
Start: 2018-04-05

## 2018-04-04 RX ADMIN — FLUTICASONE PROPIONATE 1 SPRAY: 50 SPRAY, METERED NASAL at 08:17

## 2018-04-04 RX ADMIN — GUANFACINE HYDROCHLORIDE 1 MG: 1 TABLET ORAL at 20:41

## 2018-04-04 RX ADMIN — MULTIPLE VITAMINS W/ MINERALS TAB 1 TABLET: TAB at 08:14

## 2018-04-04 RX ADMIN — OXCARBAZEPINE 300 MG: 300 TABLET ORAL at 08:13

## 2018-04-04 RX ADMIN — MAGNESIUM HYDROXIDE 30 ML: 400 SUSPENSION ORAL at 20:50

## 2018-04-04 RX ADMIN — LORATADINE 10 MG: 10 TABLET ORAL at 08:13

## 2018-04-04 RX ADMIN — ESZOPICLONE 3 MG: 3 TABLET, FILM COATED ORAL at 20:41

## 2018-04-04 RX ADMIN — OXCARBAZEPINE 300 MG: 300 TABLET ORAL at 20:41

## 2018-04-04 RX ADMIN — BUSPIRONE HYDROCHLORIDE 30 MG: 15 TABLET ORAL at 20:41

## 2018-04-04 RX ADMIN — OMEPRAZOLE 20 MG: 20 CAPSULE, DELAYED RELEASE ORAL at 08:13

## 2018-04-04 RX ADMIN — VORTIOXETINE 20 MG: 10 TABLET, FILM COATED ORAL at 08:13

## 2018-04-04 RX ADMIN — TRIFLUOPERAZINE HYDROCHLORIDE 4 MG: 2 TABLET, FILM COATED ORAL at 13:28

## 2018-04-04 RX ADMIN — BUSPIRONE HYDROCHLORIDE 30 MG: 15 TABLET ORAL at 08:13

## 2018-04-04 RX ADMIN — FOLIC ACID 1 MG: 1 TABLET ORAL at 08:13

## 2018-04-04 ASSESSMENT — ACTIVITIES OF DAILY LIVING (ADL)
GROOMING: INDEPENDENT
LAUNDRY: WITH SUPERVISION
GROOMING: INDEPENDENT
ORAL_HYGIENE: INDEPENDENT
DRESS: INDEPENDENT
ORAL_HYGIENE: INDEPENDENT
DRESS: INDEPENDENT

## 2018-04-04 NOTE — PROGRESS NOTES
[facility] completed referral packet for [employer] [facility] and received a return call from Td.  Td stated that they would not be accepting pt back at their facility due to his significant use of the House over the past several months and he did not follow rules.      [facility] provided information to patient.  Pt reported that he would then return home and work with outpatient team to obtain CD assessment and follow recommendations.      [facility] attempted to arrange transportation through [employer] however the four company's in the primary search were not going to [place], [place] or could not accommodate for today or tomorrow.  [employer], [employer], [place].  [employer] sent the case out to the research team, in the mean time it was discussed with supervisor and grey [place] ticket was purchased.    Waqas Jimenez Researcher called after 4:00pm and stated that they could have a local taxi bring patient home.  This was after ticket was already purchased.  Bus  Location:  Departure Date/Time: April 5th, 2018 @ 6:45am  [address] [address]  Phone: [phone]     Bus Drop Off Location:  Arrival Date/Time: April 5th, 2018 @ 11:10am  [facility]  [address]  [place] [address]  Phone: [phone]    Patient should leave  Around 5am via [employer] to leave sufficient time to get ticket from will call.  Pt has a code word: Bernard needed to  the ticket as he does not have photo ID.  Did ask for early tray for patient.

## 2018-04-04 NOTE — DISCHARGE INSTRUCTIONS
Behavioral Discharge Planning and Instructions      Summary:  You were admitted on 3/31/2018  due to Suicidal Ideation.  You were treated by Dr. Clif Morgan MD and discharged on 04/04/2018 from [facility] to Home.  You reported that you will work with your outpatient team to set up a CD assessment and follow those recommendations.      Principal Diagnosis:   1.  Major depression, recurrent, severe without psychotic features, F33.2.   2.  Alcohol use disorder, severe.   3.  Attention deficit disorder.   4.  Generalized anxiety disorder with social phobia.     Health Care Follow-up Appointments:   1. [facility] Appointment:  Date/Time: Friday, April 6, 2018 @ 3:00pm  Provider: Calin Cesar    2. Therapy  Appointment:  Date/Time: Monday, April 9, 2018 @ 1:30pm  Provider: Julieta Umanzor    The location for your upcoming appointments:  [place] [address]  Phone: [phone]  The Health Unit Coordinator has faxed these discharge instructions to Fax: [phone]    3. Medication Management Appointment:  Date/Time:  Tuesday, April 10, 2018 @ 9:00am   Provider: Ce Rose CNP    The location for your upcoming appointment:  [place] [address]  Phone: [phone]  The Health Unit Coordinator has faxed these discharge instructions to Fax: [phone].  Attend all scheduled appointments with your outpatient providers. Call at least 24 hours in advance if you need to reschedule an appointment to ensure continued access to your outpatient providers.   Major Treatments, Procedures and Findings:  You were provided with: a psychiatric assessment, assessed for medical stability, medication evaluation and/or management, group therapy, individual therapy, milieu management and medical interventions    Symptoms to Report: feeling more aggressive, increased confusion, losing more sleep, mood getting worse or  thoughts of suicide    Early warning signs can include: increased depression or anxiety sleep disturbances increased thoughts or behaviors of suicide or self-harm  increased unusual thinking, such as paranoia or hearing voices    Safety and Wellness:  Take all medicines as directed.  Make no changes unless your doctor suggests them.      Follow treatment recommendations.  Refrain from alcohol and non-prescribed drugs.  If there is a concern for safety, call 911.    Resources:   [facility]  Crisis Response Team [phone]  Adult Mental Health Crisis Response Services can help during a mental health crisis. Crisis response can help you to cope with a current crisis and to stay in your home and community. A trained mental health crisis responder assesses the crisis, helps to cope with the crisis, and follows up to help connect to longer term support and services.  If you need more help to be safe, crisis response can help to set up hospital care or to access a crisis stabilization bed.  Every [facility] in the [facility] has a mental health crisis telephone number.  [facility] also provide other kinds of crisis help, like:  * Mobile Crisis Response Services: Help from trained crisis responders who will come to your home (or a meeting place that you choose) to help you in a crisis  * Residential Crisis Services: Help in a facility where you can stay for a few days until the crisis is under control  * Crisis Stabilization service: Services after Crisis Response to help you to connect with other support and skills to avoid future crises    [facility]-  Phone: [phone]  [facility], located in [place], provides a broad scope of quality mental health services to our community. We strive to promote mental health and total well-being for our community members, ensuring respect and dignity during all aspects of care.      [facility]ensive [facility]idential  Treatment Program  [address] [place] [address].  [place] [address]  Phone: [phone]  [facility] is a 10-bed residential facility located in [place], [place]. [facility] is licensed to treat adults with serious and persistent mental illness. [facility] services develop and enhance psychiatric stability, personal and emotional adjustment, self-sufficiency, and skills to live in a more independent setting.    [facility] [place] [facility]  You and your family would benefit from the support groups at [facility].   Www.[email or web address]    [email or web address]  The [facility] has a parent support and educator staff - Glenn De Paz - that can be a support for your parents. There are also many classes that are available to you and your family.  Glenn can be reached at [phone] xt [phone] or through e-mail at [email or web address].    The treatment team has appreciated the opportunity to work with you.     Jomar,  please take care and make your recovery a daily recovery.   If you have any questions or concerns our unit number is [phone].  You may be receiving a follow-up phone call within the next three days from a representative from behavioral health.    You have identified the best phone number to reach you as [phone]

## 2018-04-04 NOTE — DISCHARGE SUMMARY
[facility]  Department of Psychiatry    DATE OF ADMISSION:  3/31/2018    DATE OF DISCHARGE:  April 4, 2018    DISCHARGE DIAGNOSES:   1.  Major depression, recurrent, severe   2.  Alcohol use disorder, severe.   3.  Generalized anxiety disorder    HOSPITAL COURSE: (Refer to H&P, progress notes, and consult notes for details)    The patient was admitted to our Behavioral Health Unit under a 72 hour hold for depressed mood and suicidal thoughts in the setting of alcohol usage.  Treatment was continued voluntarily.  Anxiety was his primary symptom of concern which caused him enough discomfort that it contributed to suicidal ideation.  Treatment was aimed at reducing anxiety which would result in improvement in his mood and further reducing his suicidal ideation and suicide risk.  Trintellex was continued and the dose was increased to enhance benefit.  BuSpar was added for management of anxiety with partial response.  Noting he had tried numerous other first and second line treatments of anxiety, Trileptal was initiated for mood management and anxiety reduction.  In an attempt to avoid benzodiazepine usage, Trilafon was made available as needed for breakthrough anxiety until his primary medication, BuSpar and Trileptal, which are reached her full therapeutic effect after which point I would hope a successful transition off of Trilafon can be achieved.  His mood improved as anxiety was reduced.  He no longer reported experiencing suicidal ideation.  He desired sobriety and met with our  to explore treatment options to address chemical addiction.  As these referrals are being initiated, the patient requested to be discharged home.  At the time of discharge, the pharmacy notified me that Trintellex would require a prior authorization which was submitted.  CONDITION AT DISCHARGE:  Improved.  The patients acute suicide risk is low due to the following factors:  improved  mood/anxiety symptoms.  Denies suicidal ideations. Denies psychotic symptoms.  Not actively intoxicated and plans to abstain from illicit substances and alcohol.  Denies access to guns.  Denies feeling hopeless or helpless. At the time of discharge Jomar Cruz was determined to not be an immediate danger to herself or others. The patient's acute risk will be higher if noncompliant with treatment plan, medications, follow-up or using illicit substances or alcohol.  These findings along with the risks of noncompliance with medications and treatment plan, which could potentially cause decompensation and increase the risk for suicide, were discussed with the patient.  The patients chronic suicide risk is moderate given the following factors: diagnosis of MDD, unemployed; history of chemical dependency; Denied a family history of suicide.  Preventative factors include: social supports     MENTAL STATUS EXAMINATION AT TIME OF DISCHARGE:  The patient is 28 year old Choose not to answer male who appears their stated age and is appropriately dressed with good hygiene.  Calm and cooperative with the interview questions.  No psychomotor abnormalities are noted. Eye contact is appropriate. Speech has normal rate, tone, latency and volume and is not pushed or pressured. Mood is euthymic and affect is full and appropriate.  The patient does not seem overtly depressed, anxious, manic or irritable.  Thought process is linear, logical and future oriented.  Thought process is not tangential, circumstantial or disorganized.  Thought content is not significant for apperant paranoia, delusions, ideas of reference or grandiosity.  The patient denies suicidal and homicidal ideations as well as auditory and visual hallucinations.  Insight and judgment are fair.  Cognition appears intact to interviewing including orientation, recent and remote memory, fund of knowledge, use of language, attention span and concentration.  Muscle  strength, tone and gait appear normal on visual inspection.      DISPOSITION:  The patient is discharged home     FOLLOWUP APPOINTMENTS:  ( per social workers notes and after visit summary)  1.  Referral for arm services on April 6  2.  Individual psychotherapy on April 9 through the [facility] in [place]  3.  Medication management on April 10 through the [facility]    DISCHARGE MEDICATIONS:   Current Discharge Medication List      START taking these medications    Details   busPIRone 30 MG TABS Take 30 mg by mouth 2 times daily  Qty: 60 tablet, Refills: 0    Associated Diagnoses: JESSICA (generalized anxiety disorder)      OXcarbazepine (TRILEPTAL) 150 MG tablet Take 1 tablet (150 mg) by mouth 2 times daily  Qty: 60 tablet, Refills: 0    Associated Diagnoses: JESSICA (generalized anxiety disorder)      loratadine (CLARITIN) 10 MG tablet Take 1 tablet (10 mg) by mouth daily  Qty: 30 tablet, Refills: 0    Associated Diagnoses: Chronic seasonal allergic rhinitis due to pollen      fluticasone (FLONASE) 50 MCG/ACT spray Spray 1 spray into both nostrils daily  Qty: 1 Bottle, Refills: 0    Associated Diagnoses: Chronic seasonal allergic rhinitis due to pollen      omeprazole (PRILOSEC) 20 MG CR capsule Take 1 capsule (20 mg) by mouth every morning (before breakfast)  Qty: 30 capsule, Refills: 0    Associated Diagnoses: Gastroesophageal reflux disease without esophagitis      perphenazine 2 MG tablet Take 1-2 tablets (2-4 mg) by mouth 2 times daily as needed For severe anxiety  Qty: 20 tablet, Refills: 0    Associated Diagnoses: JESSICA (generalized anxiety disorder)         CONTINUE these medications which have CHANGED    Details   vortioxetine (TRINTELLIX/BRINTELLIX) 20 MG tablet Take 1 tablet (20 mg) by mouth daily  Qty: 30 tablet, Refills: 0    Associated Diagnoses: Major depressive disorder, recurrent episode, moderate (H); JESSICA (generalized anxiety disorder)      eszopiclone  (LUNESTA) 3 MG tablet Take 1 tablet (3 mg) by mouth At Bedtime  Qty: 30 tablet, Refills: 0    Associated Diagnoses: Primary insomnia         STOP taking these medications       LORAZEPAM PO Comments:   Reason for Stopping:                LABORATORY RESULTS: (past 14 days)  Recent Results (from the past 336 hour(s))   Vitamin D    Collection Time: 04/02/18  7:35 AM   Result Value Ref Range    Vitamin D Deficiency screening 12 (L) 20 - 75 ug/L   GGT    Collection Time: 04/02/18  7:35 AM   Result Value Ref Range    GGT 12 0 - 75 U/L       >30 minutes was spent on this discharge to allow for reviewing the patient's response to treatment, reviewing plan of care, education on medications and diagnosis, and conducting a risk assessment.

## 2018-04-04 NOTE — PLAN OF CARE
Problem: Depressive Symptoms  Goal: Depressive Symptoms  Signs and symptoms of listed problems will be absent or manageable.   Outcome: Adequate for Discharge Date Met: 04/04/18  Patient denies feeling suicidal and states depression  better.  He states feeling safe for discharge. He has no signs and symptoms of Withdrawal , VSS, appetite good, sleeping better.  Plan is to discharge today with a discharge plan in place and a 30 day supply of medication.

## 2018-04-04 NOTE — PROGRESS NOTES
Pt attended community group and rated his mood at a 7/10 however he was in his room sleeping all shift starting at 1845.      04/03/18 1440   Behavioral Health   Hallucinations denies / not responding to hallucinations   Thinking poor concentration   Orientation person: oriented;place: oriented;date: oriented;time: oriented   Memory baseline memory   Insight poor   Judgement impaired   Eye Contact at examiner   Affect full range affect   Mood mood is calm   Physical Appearance/Attire attire appropriate to age and situation   Hygiene well groomed   1. Wish to be Dead No   2. Non-Specific Active Suicidal Thoughts  No

## 2018-04-04 NOTE — PROGRESS NOTES
Patient Rx History Report  MARILIN DEE  Search Criteria: Last Name 'Marilin' and First Name 'Jomar' and  = '[birth year]' and Request Period = '  to ' - 5 out of 5 Recipients Selected.  Fill Date Product, Str, Form Qty Days Pt ID Prescriber Written RX# N/R* Pharm **MED+  ---------- -------------------------------- ------ ---- --------- ---------- ---------- ------------ ----- --------- ------  2018 LORAZEPAM 0.5 MG TABLET 90.00 30 [id number] 2018 [id number] N [id number] 00.0    2018 ESZOPICLONE 3 MG TABLET 30.00 30 [id number] 2017 [id number] R [id number] 00.0    2018 ESZOPICLONE 3 MG TABLET 30.00 30 [id number] 2017 [id number] R [id number] 00.0    2017 ESZOPICLONE 3 MG TABLET 30.00 30 [id number] 2017 [id number] N [id number] 00.0  2017 LORAZEPAM 0.5 MG TABLET 60.00 30 [id number] 2017 [id number] N [id number] 00.0  2017 HYDROCODONE-ACETAMIN 5-325 MG 6.00 1 [id number] 2017 [id number] N [id number] 30.0    2017 LORAZEPAM 0.5 MG TABLET 30.00 15 [id number] 2017 [id number] N [id number] 00.0  2017 ESZOPICLONE 3 MG TABLET 30.00 30 [id number] 2017 [id number] N [id number] 00.0  2017 LORAZEPAM 0.5 MG TABLET 30.00 15 [id number] 2017 [id number] N [id number] 00.0  2017 ESZOPICLONE 3 MG TABLET 15.00 15 [id number] 2017 [id number] N [id number] 00.0    10/03/2017 DEXTROAMP-AMPHET ER 30 MG CAP 30.00 30 [id number] 10/02/2017 [id number] N [id number] 00.0  10/03/2017 ESZOPICLONE 3 MG TABLET 15.00 15 [id number] 10/02/2017 [id number] N [id number] 00.0  10/03/2017 DEXTROAMP-AMPHETAMIN 10 MG TAB 30.00 30 [id number] 10/02/2017 [id number] N [id number] 00.0    2017 ESZOPICLONE 3 MG TABLET 30.00 30 [id number] 2017 [id number] N [id number] 00.0  2017 ADDERALL XR 30 MG CAPSULE 30.00 30 [id number] 2017 [id number] N [id number]  00.0    08/26/2017 ESZOPICLONE 3 MG TABLET 30.00 30 [id number] 05/12/2017 [id number] R [id number] 00.0  08/25/2017 ADDERALL XR 30 MG CAPSULE 30.00 30 [id number] 08/25/2017 [id number] N [id number] 00.0  08/24/2017 VYVANSE 60 MG CAPSULE 30.00 30 [id number] 08/18/2017 [id number] N [id number] 00.0  08/17/2017 DEXTROAMP-AMPHETAMIN 10 MG TAB 30.00 30 [id number] 08/17/2017 [id number] N [id number] 00.0    07/27/2017 ESZOPICLONE 3 MG TABLET 30.00 30 [id number] 05/12/2017 [id number] R [id number] 00.0  07/17/2017 DEXTROAMP-AMPHETAMIN 10 MG TAB 30.00 30 [id number] 06/06/2017 [id number] N [id number] 00.0  07/17/2017 VYVANSE 60 MG CAPSULE 30.00 30 [id number] 05/12/2017 [id number] N [id number] 00.0    06/29/2017 ESZOPICLONE 3 MG TABLET 30.00 30 [id number] 05/12/2017 [id number] R [id number] 00.0  06/14/2017 DEXTROAMP-AMPHETAMIN 10 MG TAB 30.00 30 [id number] 06/14/2017 [id number] N [id number] 00.0  06/12/2017 VYVANSE 60 MG CAPSULE 30.00 30 [id number] 05/12/2017 [id number] N [id number] 00.0  06/07/2017 GABAPENTIN 300 MG CAPSULE 90.00 30 [id number] 06/07/2017 [id number] N [id number] 00.0    05/27/2017 ESZOPICLONE 3 MG TABLET 30.00 30 [id number] 05/12/2017 [id number] N [id number] 00.0  05/16/2017 DEXTROAMP-AMPHETAMIN 10 MG TAB 30.00 30 [id number] 05/12/2017 [id number] N [id number] 00.0  05/16/2017 VYVANSE 60 MG CAPSULE 30.00 30 [id number] 05/12/2017 [id number] N [id number] 00.0  05/01/2017 ESZOPICLONE 3 MG TABLET 30.00 30 [id number] 04/28/2017 [id number] N [id number] 00.0    04/28/2017 ESZOPICLONE 3 MG TABLET 30.00 30 [id number] 04/28/2017 [id number] N [id number] 00.0

## 2018-04-04 NOTE — PLAN OF CARE
Problem: Depressive Symptoms  Goal: Social and Therapeutic (Depression)  Signs and symptoms of listed problems will be absent or manageable.   Pt attended all OT groups today designed to provide education about communication and promote positive social interaction and occupational engagement through hands on activity. Pt was engaged with others and demonstrated appropriate listening and communication skills. Pt demonstrated focus and organization on goal-directed task. Pt stayed for half the group on communication. Left for pending discharge matters.

## 2018-04-05 NOTE — PROGRESS NOTES
Pt denies SI/SIB. Pt is hopeful for discharge - pt stated he will follow all appointments set and will complete out patient treatment. Pt attended community meeting and was social.      04/04/18 2116   Behavioral Health   Hallucinations denies / not responding to hallucinations   Thinking intact   Orientation person: oriented;place: oriented   Memory baseline memory   Insight admits / accepts   Judgement intact   Eye Contact at examiner   Affect full range affect   Mood mood is calm   Suicidality other (see comments)  (pt denies)   Self Injury other (see comment)  (pt denies)   Activity other (see comment)  (community meeting)   Activities of Daily Living   Hygiene/Grooming independent   Oral Hygiene independent   Dress independent   Room Organization independent   Behavioral Health Interventions   Depression maintain safety precautions;maintain safe secure environment;provide emotional support   Social and Therapeutic Interventions (Depression) encourage socialization with peers;encourage effective boundaries with peers;encourage participation in therapeutic groups and milieu activities

## 2018-04-05 NOTE — PROGRESS NOTES
Pt woke at 0430 and ate small breakfast. He completed packing belongings and signed belongings inventory sheet after affirming all belongings present. Pt had previously reviewed medications with staff, completed updated suicide risk assessment with wtr and voiced no questions or concerns. He was discharge at 0500 to home, transported by [employer] to [facility] bus station.

## 2024-09-05 ENCOUNTER — ENROLLMENT (OUTPATIENT)
Dept: HOME HEALTH SERVICES | Facility: HOME HEALTH | Age: 34
End: 2024-09-05
Payer: COMMERCIAL

## 2024-11-14 ENCOUNTER — HOME INFUSION BILLING (OUTPATIENT)
Dept: HOME HEALTH SERVICES | Facility: HOME HEALTH | Age: 34
End: 2024-11-14
Payer: COMMERCIAL

## 2025-01-07 ENCOUNTER — HOME INFUSION (OUTPATIENT)
Dept: HOME HEALTH SERVICES | Facility: HOME HEALTH | Age: 35
End: 2025-01-07
Payer: COMMERCIAL

## 2025-01-07 DIAGNOSIS — R45.851 SUICIDAL IDEATION: Primary | ICD-10-CM

## 2025-01-14 ENCOUNTER — HOME INFUSION BILLING (OUTPATIENT)
Dept: HOME HEALTH SERVICES | Facility: HOME HEALTH | Age: 35
End: 2025-01-14
Payer: COMMERCIAL

## 2025-01-16 PROCEDURE — S0013 ESKETAMINE, NASAL SPRAY: HCPCS | Mod: JZ

## 2025-01-23 ENCOUNTER — MEDICAL CORRESPONDENCE (OUTPATIENT)
Dept: HOME HEALTH SERVICES | Facility: HOME HEALTH | Age: 35
End: 2025-01-23

## 2025-03-19 ENCOUNTER — HOME INFUSION (OUTPATIENT)
Dept: HOME HEALTH SERVICES | Facility: HOME HEALTH | Age: 35
End: 2025-03-19
Payer: COMMERCIAL

## 2025-03-19 DIAGNOSIS — R45.851 SUICIDAL IDEATION: Primary | ICD-10-CM

## 2025-03-26 ENCOUNTER — HOME INFUSION BILLING (OUTPATIENT)
Dept: HOME HEALTH SERVICES | Facility: HOME HEALTH | Age: 35
End: 2025-03-26
Payer: COMMERCIAL

## 2025-03-28 PROCEDURE — S0013 ESKETAMINE, NASAL SPRAY: HCPCS | Mod: JZ

## 2025-06-09 ENCOUNTER — MEDICAL CORRESPONDENCE (OUTPATIENT)
Dept: HOME HEALTH SERVICES | Facility: HOME HEALTH | Age: 35
End: 2025-06-09
Payer: COMMERCIAL